# Patient Record
Sex: MALE | Race: BLACK OR AFRICAN AMERICAN | NOT HISPANIC OR LATINO | Employment: FULL TIME | URBAN - METROPOLITAN AREA
[De-identification: names, ages, dates, MRNs, and addresses within clinical notes are randomized per-mention and may not be internally consistent; named-entity substitution may affect disease eponyms.]

---

## 2022-05-05 ENCOUNTER — APPOINTMENT (OUTPATIENT)
Dept: ULTRASOUND IMAGING | Facility: CLINIC | Age: 52
End: 2022-05-05
Attending: EMERGENCY MEDICINE
Payer: COMMERCIAL

## 2022-05-05 ENCOUNTER — HOSPITAL ENCOUNTER (EMERGENCY)
Facility: CLINIC | Age: 52
Discharge: HOME | End: 2022-05-05
Attending: EMERGENCY MEDICINE | Admitting: EMERGENCY MEDICINE
Payer: COMMERCIAL

## 2022-05-05 VITALS
RESPIRATION RATE: 18 BRPM | TEMPERATURE: 97.6 F | DIASTOLIC BLOOD PRESSURE: 94 MMHG | HEART RATE: 73 BPM | SYSTOLIC BLOOD PRESSURE: 155 MMHG | WEIGHT: 190 LBS | OXYGEN SATURATION: 100 %

## 2022-05-05 DIAGNOSIS — N45.2: ICD-10-CM

## 2022-05-05 LAB
ALBUMIN UR-MCNC: NEGATIVE MG/DL
ANION GAP SERPL CALCULATED.3IONS-SCNC: 10 MMOL/L (ref 5–18)
APPEARANCE UR: CLEAR
BACTERIA #/AREA URNS HPF: ABNORMAL /HPF
BILIRUB UR QL STRIP: NEGATIVE
BUN SERPL-MCNC: 6 MG/DL (ref 8–22)
CALCIUM SERPL-MCNC: 9.7 MG/DL (ref 8.5–10.5)
CHLORIDE BLD-SCNC: 103 MMOL/L (ref 98–107)
CO2 SERPL-SCNC: 26 MMOL/L (ref 22–31)
COLOR UR AUTO: ABNORMAL
CREAT SERPL-MCNC: 1.09 MG/DL (ref 0.7–1.3)
ERYTHROCYTE [DISTWIDTH] IN BLOOD BY AUTOMATED COUNT: 13.4 % (ref 10–15)
GFR SERPL CREATININE-BSD FRML MDRD: 82 ML/MIN/1.73M2
GLUCOSE BLD-MCNC: 100 MG/DL (ref 70–125)
GLUCOSE UR STRIP-MCNC: NEGATIVE MG/DL
HCT VFR BLD AUTO: 43.4 % (ref 40–53)
HGB BLD-MCNC: 14.9 G/DL (ref 13.3–17.7)
HGB UR QL STRIP: NEGATIVE
HOLD SPECIMEN: NORMAL
KETONES UR STRIP-MCNC: NEGATIVE MG/DL
LEUKOCYTE ESTERASE UR QL STRIP: ABNORMAL
MCH RBC QN AUTO: 28 PG (ref 26.5–33)
MCHC RBC AUTO-ENTMCNC: 34.3 G/DL (ref 31.5–36.5)
MCV RBC AUTO: 82 FL (ref 78–100)
NITRATE UR QL: NEGATIVE
PH UR STRIP: 7.5 [PH] (ref 5–7)
PLATELET # BLD AUTO: 253 10E3/UL (ref 150–450)
POTASSIUM BLD-SCNC: 4.1 MMOL/L (ref 3.5–5)
RBC # BLD AUTO: 5.32 10E6/UL (ref 4.4–5.9)
RBC URINE: 3 /HPF
SODIUM SERPL-SCNC: 139 MMOL/L (ref 136–145)
SP GR UR STRIP: 1.01 (ref 1–1.03)
UROBILINOGEN UR STRIP-MCNC: <2 MG/DL
WBC # BLD AUTO: 10 10E3/UL (ref 4–11)
WBC URINE: 36 /HPF

## 2022-05-05 PROCEDURE — 36415 COLL VENOUS BLD VENIPUNCTURE: CPT | Performed by: EMERGENCY MEDICINE

## 2022-05-05 PROCEDURE — 250N000011 HC RX IP 250 OP 636: Performed by: EMERGENCY MEDICINE

## 2022-05-05 PROCEDURE — 76870 US EXAM SCROTUM: CPT

## 2022-05-05 PROCEDURE — 99284 EMERGENCY DEPT VISIT MOD MDM: CPT | Mod: 25

## 2022-05-05 PROCEDURE — 96375 TX/PRO/DX INJ NEW DRUG ADDON: CPT

## 2022-05-05 PROCEDURE — 81001 URINALYSIS AUTO W/SCOPE: CPT | Performed by: EMERGENCY MEDICINE

## 2022-05-05 PROCEDURE — 87086 URINE CULTURE/COLONY COUNT: CPT | Performed by: EMERGENCY MEDICINE

## 2022-05-05 PROCEDURE — 96374 THER/PROPH/DIAG INJ IV PUSH: CPT

## 2022-05-05 PROCEDURE — 82435 ASSAY OF BLOOD CHLORIDE: CPT | Performed by: EMERGENCY MEDICINE

## 2022-05-05 PROCEDURE — 85027 COMPLETE CBC AUTOMATED: CPT | Performed by: EMERGENCY MEDICINE

## 2022-05-05 RX ORDER — LEVOFLOXACIN 500 MG/1
500 TABLET, FILM COATED ORAL DAILY
Qty: 10 TABLET | Refills: 0 | Status: SHIPPED | OUTPATIENT
Start: 2022-05-05 | End: 2022-05-15

## 2022-05-05 RX ORDER — KETOROLAC TROMETHAMINE 15 MG/ML
15 INJECTION, SOLUTION INTRAMUSCULAR; INTRAVENOUS ONCE
Status: COMPLETED | OUTPATIENT
Start: 2022-05-05 | End: 2022-05-05

## 2022-05-05 RX ORDER — IBUPROFEN 400 MG/1
400 TABLET, FILM COATED ORAL EVERY 6 HOURS PRN
Qty: 30 TABLET | Refills: 0 | Status: SHIPPED | OUTPATIENT
Start: 2022-05-05

## 2022-05-05 RX ORDER — ONDANSETRON 2 MG/ML
4 INJECTION INTRAMUSCULAR; INTRAVENOUS ONCE
Status: COMPLETED | OUTPATIENT
Start: 2022-05-05 | End: 2022-05-05

## 2022-05-05 RX ADMIN — KETOROLAC TROMETHAMINE 15 MG: 15 INJECTION, SOLUTION INTRAMUSCULAR; INTRAVENOUS at 09:16

## 2022-05-05 RX ADMIN — ONDANSETRON 4 MG: 2 INJECTION INTRAMUSCULAR; INTRAVENOUS at 09:16

## 2022-05-05 ASSESSMENT — ENCOUNTER SYMPTOMS
VOMITING: 0
NAUSEA: 0
COUGH: 0
FEVER: 0
SHORTNESS OF BREATH: 0
DIARRHEA: 0
ABDOMINAL PAIN: 1

## 2022-05-05 NOTE — Clinical Note
Luis Alberto Maloney was seen and treated in our emergency department on 5/5/2022.  He may return to work on 05/07/2022.       If you have any questions or concerns, please don't hesitate to call.      Ashely Crowder MD

## 2022-05-05 NOTE — ED PROVIDER NOTES
EMERGENCY DEPARTMENT ENCOUNTER      NAME: Luis Alberto Maloney  AGE: 51 year old male  YOB: 1970  MRN: [id number]  EVALUATION DATE & TIME: No admission date for patient encounter.    PCP: No Ref-Primary, Physician    ED PROVIDER: Ashely Crowder M.D.        Chief Complaint   Patient presents with     Groin Pain         FINAL IMPRESSION:    1. 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  Pain will sometimes shoot up into the left lower abdomen.  Examination shows some very slight tenderness to the left testicle otherwise  exam unremarkable.  Ultrasound does show some findings to suggest Epididymal orchitis.   Also possibility for UTI.  Plan at this time is to treat with Levaquin and have him follow-up with primary care clinic.  He does not appear toxic or septic and he agrees with this plan.      ED COURSE:  8:11 AM  I met with the patient to gather history and perform my exam. ED course and treatment discussed.    9:55 AM  I performed a more thorough physical exam, now that patient has been roomed.  Exam overall was very benign except for some mild tenderness to the left testicle.  This would still be consistent with the ultrasound findings.  Nothing to suggest torsion.  He states that he has been monogamous with the same female partner for more than 6 months.  He is denies any history of STDs.  Therefore plan at this time is to treat him with Levaquin and have him follow-up with primary care.  She does not have a true primary care and therefore referral will be sent to the primary care connection line.  I do feel this warrants follow-up in this patient in general.    No abdominal pain or inguinal pain.  Nothing to suggest hernia.  Both testicles are centered.  He has no lesions or rash on examination.  Penis is otherwise normal without discharge.  He is circumcised.    I do not think that this  "represents ACS, PE, ruptured AAA, aortic dissection, bowel obstruction, bowel ischemia, cholecystitis, pancreatitis, appendicitis, diverticulitis, kidney stone, pyelonephritis, incarcerated or strangulated hernia, testicular torsion, viscus perforation, perforated GI ulcer, or other such etiologies at this time.      COVID-19 PPE worn during patient evaluation:  Mask: n95 and homemade masks   Eye Protection: goggles   Gown: none   Hair cover: yes  Face shield: none   Patient wearing a mask: yes     At the conclusion of the encounter I discussed the results of all of the tests and the disposition. Their questions were answered. The patient (and any family present) acknowledged understanding and were agreeable with the care plan.        CONSULTANTS:  Referral for primary care        MEDICATIONS GIVEN IN THE EMERGENCY:  Medications   ondansetron (ZOFRAN) injection 4 mg (4 mg Intravenous Given 5/5/22 0916)   ketorolac (TORADOL) injection 15 mg (15 mg Intravenous Given 5/5/22 0916)           NEW PRESCRIPTIONS STARTED AT TODAY'S ER VISIT     Medication List      Started    ibuprofen 400 MG tablet  Commonly known as: ADVIL/MOTRIN  400 mg, Oral, EVERY 6 HOURS PRN     levofloxacin 500 MG tablet  Commonly known as: Levaquin  500 mg, Oral, DAILY                CONDITION:  stable        DISPOSITION:  discharge home         =================================================================  =================================================================    HPI    Patient information was obtained from: Patient    Use of Intrepreter: N/A     Luis Alberto Maloney is a 51 year old male with history of erectile dysfunction who presents to the ER with complaints of groin pain.     Patient reports on 5/1 (~4 days ago) patient had onset of left groin pain that patient states is in his left testicle with radiation into left lower abdomen. He states he \"feels a lump\" on his left testicle. No dysuria.     Denies fever, chest pain, cough, shortness " of breath, nausea, vomiting, diarrhea, or additional medical concerns or complaints at this time.         REVIEW OF SYSTEMS  Review of Systems   Constitutional: Negative for fever.   Respiratory: Negative for cough and shortness of breath.    Cardiovascular: Negative for chest pain.   Gastrointestinal: Positive for abdominal pain. Negative for diarrhea, nausea and vomiting.   Genitourinary: Positive for testicular pain. Negative for penile discharge, penile pain, penile swelling, scrotal swelling and urgency.   Allergic/Immunologic: Negative for immunocompromised state.   All other systems reviewed and are negative.        PAST MEDICAL HISTORY:  History reviewed. No pertinent past medical history.      PAST SURGICAL HISTORY:  History reviewed. No pertinent surgical history.      CURRENT MEDICATIONS:    Prior to Admission medications    Not on File         ALLERGIES:  No Known Allergies      FAMILY HISTORY:  History reviewed. No pertinent family history.      SOCIAL HISTORY:  Social History     Socioeconomic History     Marital status:          VITALS:  Patient Vitals for the past 24 hrs:   BP Temp Temp src Pulse Resp SpO2 Weight   05/05/22 0645 (!) 155/94 97.6  F (36.4  C) Oral 73 18 100 % 86.2 kg (190 lb)       Wt Readings from Last 3 Encounters:   05/05/22 86.2 kg (190 lb)         PHYSICAL EXAM    Constitutional:  Well developed, Well nourished, NAD  HENT:  Normocephalic, Atraumatic, Bilateral external ears normal, Nose normal. Neck- Supple, No stridor.   Eyes:  PERRL, EOMI, Conjunctiva normal, No discharge.  Respiratory:  Normal breath sounds, No respiratory distress, No wheezing, Speaks full sentences easily. No cough.   Cardiovascular:  Normal heart rate, Regular rhythm, No rubs, No gallops.   GI:  No excessive obesity.  Bowel sounds normal, Soft, No tenderness, No masses, No flank tenderness. No rebound or guarding.   : Normal circumcised penis without discharge, swelling, or rash.  Right testicle is  normal without tenderness.  Left testicle does have some slight tenderness.  No obvious swelling.  No rash to the genitals or lesions.  Musculoskeletal: No major deformities noted.  Integument:  Warm, Dry, No erythema, No rash.  No petechiae.  Neurologic:  Alert & oriented x 3, No focal deficits noted. Normal gait.   Psychiatric:  Affect normal, Cooperative          LAB:  All pertinent labs reviewed and interpreted.  Recent Results (from the past 24 hour(s))   CBC (+ platelets, no diff)    Collection Time: 05/05/22  7:43 AM   Result Value Ref Range    WBC Count 10.0 4.0 - 11.0 10e3/uL    RBC Count 5.32 4.40 - 5.90 10e6/uL    Hemoglobin 14.9 13.3 - 17.7 g/dL    Hematocrit 43.4 40.0 - 53.0 %    MCV 82 78 - 100 fL    MCH 28.0 26.5 - 33.0 pg    MCHC 34.3 31.5 - 36.5 g/dL    RDW 13.4 10.0 - 15.0 %    Platelet Count 253 150 - 450 10e3/uL   Basic metabolic panel    Collection Time: 05/05/22  7:43 AM   Result Value Ref Range    Sodium 139 136 - 145 mmol/L    Potassium 4.1 3.5 - 5.0 mmol/L    Chloride 103 98 - 107 mmol/L    Carbon Dioxide (CO2) 26 22 - 31 mmol/L    Anion Gap 10 5 - 18 mmol/L    Urea Nitrogen 6 (L) 8 - 22 mg/dL    Creatinine 1.09 0.70 - 1.30 mg/dL    Calcium 9.7 8.5 - 10.5 mg/dL    Glucose 100 70 - 125 mg/dL    GFR Estimate 82 >60 mL/min/1.73m2   Extra Red Top Tube    Collection Time: 05/05/22  7:43 AM   Result Value Ref Range    Hold Specimen JIC    Extra Green Top (Lithium Heparin) Tube    Collection Time: 05/05/22  7:43 AM   Result Value Ref Range    Hold Specimen JIC    Extra Purple Top Tube    Collection Time: 05/05/22  7:43 AM   Result Value Ref Range    Hold Specimen JIC    UA with Microscopic reflex to Culture    Collection Time: 05/05/22  7:54 AM    Specimen: Urine, Clean Catch   Result Value Ref Range    Color Urine Light Yellow Colorless, Straw, Light Yellow, Yellow    Appearance Urine Clear Clear    Glucose Urine Negative Negative mg/dL    Bilirubin Urine Negative Negative    Ketones Urine  Negative Negative mg/dL    Specific Gravity Urine 1.012 1.001 - 1.030    Blood Urine Negative Negative    pH Urine 7.5 (H) 5.0 - 7.0    Protein Albumin Urine Negative Negative mg/dL    Urobilinogen Urine <2.0 <2.0 mg/dL    Nitrite Urine Negative Negative    Leukocyte Esterase Urine 250 Mekhi/uL (A) Negative    Bacteria Urine Few (A) None Seen /HPF    RBC Urine 3 (H) <=2 /HPF    WBC Urine 36 (H) <=5 /HPF       No results found for: ABORH        RADIOLOGY:  Reviewed all pertinent imaging. Please see official radiology report.    US Testicular & Scrotum w Doppler Ltd   Final Result   IMPRESSION:   1.  Heterogeneous enlargement and hyperemia of the left epididymis, hyperemia of the left testicle and trace left hydrocele compatible with epididymal orchitis.            EKG:    None      PROCEDURES:  none      I, Alexia Granado, am serving as a scribe to document services personally performed by Dr. Ashely Crowder based on my observation and the provider's statements to me. I, Dr. Ashely Crowder MD attest that Alexia Granado is acting in a scribe capacity, has observed my performance of the services and has documented them in accordance with my direction.        Ashely Crowder M.D. [facility]  Emergency Medicine and Medical Toxicology  Formerly [facility] EMERGENCY ROOM  [address] [facility] [id number] [phone]  Dept: [phone]           Ashely Crowder MD  05/05/22 104

## 2022-05-05 NOTE — ED TRIAGE NOTES
Presents to the ED with C/O groin pain that started on Monday     Denies urinary and bowel symptoms   Endorses swelling  Denies fever

## 2022-05-06 LAB — BACTERIA UR CULT: NO GROWTH

## 2023-09-02 NOTE — DISCHARGE INSTRUCTIONS
Take the antibiotics as directed and until gone.  Use ibuprofen to help control pain.    Call and make an appointment to follow-up with a primary care doctor for next week for reevaluation of this pain.  We want to be sure that this testicle completely resolves and goes back to normal.    Return emergency department with worse pain, fever, rash, or any other concerns.    Thank you for choosing [facility].  It has been my pleasure caring for you today.     ~Dr. Lakesha MD  
For information on Fall & Injury Prevention, visit: [email or web address] OR  [email or web address] OR  https://www.cdc.gov/steadi/patient.html